# Patient Record
Sex: FEMALE | Race: WHITE | NOT HISPANIC OR LATINO | Employment: UNEMPLOYED | URBAN - METROPOLITAN AREA
[De-identification: names, ages, dates, MRNs, and addresses within clinical notes are randomized per-mention and may not be internally consistent; named-entity substitution may affect disease eponyms.]

---

## 2018-04-05 ENCOUNTER — HOSPITAL ENCOUNTER (EMERGENCY)
Facility: HOSPITAL | Age: 38
Discharge: PSYCHIATRIC OR SUBSTANCE USE FACILITY | End: 2018-04-05
Attending: EMERGENCY MEDICINE
Payer: MEDICAID

## 2018-04-05 VITALS
BODY MASS INDEX: 23.6 KG/M2 | DIASTOLIC BLOOD PRESSURE: 67 MMHG | HEART RATE: 99 BPM | OXYGEN SATURATION: 98 % | WEIGHT: 125 LBS | HEIGHT: 61 IN | SYSTOLIC BLOOD PRESSURE: 102 MMHG | TEMPERATURE: 99 F | RESPIRATION RATE: 16 BRPM

## 2018-04-05 DIAGNOSIS — R45.851 SUICIDAL IDEATION: ICD-10-CM

## 2018-04-05 DIAGNOSIS — F22 DELUSIONAL DISORDER: Primary | ICD-10-CM

## 2018-04-05 DIAGNOSIS — R41.82 ALTERED MENTAL STATUS: ICD-10-CM

## 2018-04-05 DIAGNOSIS — T50.902A DRUG OVERDOSE, INTENTIONAL SELF-HARM, INITIAL ENCOUNTER: ICD-10-CM

## 2018-04-05 LAB
ALBUMIN SERPL BCP-MCNC: 4.9 G/DL
ALP SERPL-CCNC: 69 U/L
ALT SERPL W/O P-5'-P-CCNC: 10 U/L
AMPHET+METHAMPHET UR QL: NEGATIVE
ANION GAP SERPL CALC-SCNC: 16 MMOL/L
APAP SERPL-MCNC: <3 UG/ML
APAP SERPL-MCNC: <3 UG/ML
AST SERPL-CCNC: 13 U/L
B-HCG UR QL: NEGATIVE
BACTERIA #/AREA URNS HPF: NORMAL /HPF
BARBITURATES UR QL SCN>200 NG/ML: NEGATIVE
BASOPHILS # BLD AUTO: 0.02 K/UL
BASOPHILS NFR BLD: 0.2 %
BENZODIAZ UR QL SCN>200 NG/ML: NEGATIVE
BILIRUB SERPL-MCNC: 0.9 MG/DL
BILIRUB UR QL STRIP: NEGATIVE
BUN SERPL-MCNC: 7 MG/DL
BZE UR QL SCN: NEGATIVE
CALCIUM SERPL-MCNC: 10.3 MG/DL
CANNABINOIDS UR QL SCN: NEGATIVE
CHLORIDE SERPL-SCNC: 104 MMOL/L
CLARITY UR: CLEAR
CO2 SERPL-SCNC: 23 MMOL/L
COLOR UR: YELLOW
CREAT SERPL-MCNC: 0.9 MG/DL
CREAT UR-MCNC: 170.3 MG/DL
CTP QC/QA: YES
DIFFERENTIAL METHOD: ABNORMAL
EOSINOPHIL # BLD AUTO: 0 K/UL
EOSINOPHIL NFR BLD: 0.1 %
ERYTHROCYTE [DISTWIDTH] IN BLOOD BY AUTOMATED COUNT: 12.4 %
EST. GFR  (AFRICAN AMERICAN): >60 ML/MIN/1.73 M^2
EST. GFR  (NON AFRICAN AMERICAN): >60 ML/MIN/1.73 M^2
ETHANOL SERPL-MCNC: <10 MG/DL
GLUCOSE SERPL-MCNC: 129 MG/DL
GLUCOSE UR QL STRIP: NEGATIVE
HCT VFR BLD AUTO: 44.8 %
HGB BLD-MCNC: 16.1 G/DL
HGB UR QL STRIP: NEGATIVE
HYALINE CASTS #/AREA URNS LPF: 0 /LPF
KETONES UR QL STRIP: ABNORMAL
LEUKOCYTE ESTERASE UR QL STRIP: NEGATIVE
LYMPHOCYTES # BLD AUTO: 1 K/UL
LYMPHOCYTES NFR BLD: 9.4 %
MCH RBC QN AUTO: 31.9 PG
MCHC RBC AUTO-ENTMCNC: 35.9 G/DL
MCV RBC AUTO: 89 FL
METHADONE UR QL SCN>300 NG/ML: NEGATIVE
MICROSCOPIC COMMENT: NORMAL
MONOCYTES # BLD AUTO: 1 K/UL
MONOCYTES NFR BLD: 9.1 %
NEUTROPHILS # BLD AUTO: 8.8 K/UL
NEUTROPHILS NFR BLD: 81.2 %
NITRITE UR QL STRIP: NEGATIVE
OPIATES UR QL SCN: NEGATIVE
PCP UR QL SCN>25 NG/ML: NEGATIVE
PH UR STRIP: 6 [PH] (ref 5–8)
PLATELET # BLD AUTO: 422 K/UL
PMV BLD AUTO: 10.8 FL
POTASSIUM SERPL-SCNC: 3.5 MMOL/L
PROT SERPL-MCNC: 8.4 G/DL
PROT UR QL STRIP: ABNORMAL
RBC # BLD AUTO: 5.04 M/UL
RBC #/AREA URNS HPF: 1 /HPF (ref 0–4)
SODIUM SERPL-SCNC: 143 MMOL/L
SP GR UR STRIP: 1.02 (ref 1–1.03)
SQUAMOUS #/AREA URNS HPF: 1 /HPF
TOXICOLOGY INFORMATION: NORMAL
TSH SERPL DL<=0.005 MIU/L-ACNC: 1.24 UIU/ML
URN SPEC COLLECT METH UR: ABNORMAL
UROBILINOGEN UR STRIP-ACNC: NEGATIVE EU/DL
WBC # BLD AUTO: 10.82 K/UL
WBC #/AREA URNS HPF: 0 /HPF (ref 0–5)

## 2018-04-05 PROCEDURE — 99285 EMERGENCY DEPT VISIT HI MDM: CPT | Mod: 25

## 2018-04-05 PROCEDURE — 96372 THER/PROPH/DIAG INJ SC/IM: CPT | Mod: 59

## 2018-04-05 PROCEDURE — 80320 DRUG SCREEN QUANTALCOHOLS: CPT

## 2018-04-05 PROCEDURE — 63600175 PHARM REV CODE 636 W HCPCS: Performed by: EMERGENCY MEDICINE

## 2018-04-05 PROCEDURE — 80307 DRUG TEST PRSMV CHEM ANLYZR: CPT

## 2018-04-05 PROCEDURE — 85025 COMPLETE CBC W/AUTO DIFF WBC: CPT

## 2018-04-05 PROCEDURE — 96375 TX/PRO/DX INJ NEW DRUG ADDON: CPT

## 2018-04-05 PROCEDURE — 84443 ASSAY THYROID STIM HORMONE: CPT

## 2018-04-05 PROCEDURE — 80329 ANALGESICS NON-OPIOID 1 OR 2: CPT | Mod: 59

## 2018-04-05 PROCEDURE — 96374 THER/PROPH/DIAG INJ IV PUSH: CPT

## 2018-04-05 PROCEDURE — 80053 COMPREHEN METABOLIC PANEL: CPT

## 2018-04-05 PROCEDURE — 81025 URINE PREGNANCY TEST: CPT | Performed by: EMERGENCY MEDICINE

## 2018-04-05 PROCEDURE — 81000 URINALYSIS NONAUTO W/SCOPE: CPT | Mod: 59

## 2018-04-05 RX ORDER — DIPHENHYDRAMINE HYDROCHLORIDE 50 MG/ML
25 INJECTION INTRAMUSCULAR; INTRAVENOUS
Status: COMPLETED | OUTPATIENT
Start: 2018-04-05 | End: 2018-04-05

## 2018-04-05 RX ORDER — DIPHENHYDRAMINE HYDROCHLORIDE 50 MG/ML
25 INJECTION INTRAMUSCULAR; INTRAVENOUS
Status: DISCONTINUED | OUTPATIENT
Start: 2018-04-05 | End: 2018-04-05

## 2018-04-05 RX ORDER — LORAZEPAM 2 MG/ML
3 INJECTION INTRAMUSCULAR
Status: COMPLETED | OUTPATIENT
Start: 2018-04-05 | End: 2018-04-05

## 2018-04-05 RX ORDER — LORAZEPAM 2 MG/ML
3 INJECTION INTRAMUSCULAR
Status: DISCONTINUED | OUTPATIENT
Start: 2018-04-05 | End: 2018-04-05

## 2018-04-05 RX ORDER — ZIPRASIDONE MESYLATE 20 MG/ML
20 INJECTION, POWDER, LYOPHILIZED, FOR SOLUTION INTRAMUSCULAR
Status: COMPLETED | OUTPATIENT
Start: 2018-04-05 | End: 2018-04-05

## 2018-04-05 RX ADMIN — ZIPRASIDONE MESYLATE 20 MG: 20 INJECTION, POWDER, LYOPHILIZED, FOR SOLUTION INTRAMUSCULAR at 01:04

## 2018-04-05 RX ADMIN — DIPHENHYDRAMINE HYDROCHLORIDE 25 MG: 50 INJECTION, SOLUTION INTRAMUSCULAR; INTRAVENOUS at 01:04

## 2018-04-05 RX ADMIN — LORAZEPAM 3 MG: 2 INJECTION INTRAMUSCULAR; INTRAVENOUS at 01:04

## 2018-04-05 NOTE — ED NOTES
Report received from BEAU Gray. Pt asleep in bed, placed on portable pulse ox to monitor pt after medication per MD verbal order. Pt in no acute distress. Sitter at bedside, will continue to monitor.

## 2018-04-05 NOTE — ED PROVIDER NOTES
"Encounter Date: 4/5/2018    SCRIBE #1 NOTE: I, Jessica Jake, am scribing for, and in the presence of,  Sarabjit Mabry MD. I have scribed the following portions of the note - Other sections scribed: HPI and ROS.       History     Chief Complaint   Patient presents with    Suicidal     Took 25-30 mg of lortabs at 10:00 am in attempt to harm herself.  Pt reports vomiting prior to EMS arrival, and stated "I think they had some of the medicine in my throw up."       CC: Suicidal    HPI: This 38 y.o. female with no medical history presents to the ED for evaluation of suicidal ideation. Pt reports taking 5-6x tablets (5 mg each) of Loratab this morning in attempts to harm herself. She states that she experienced 1x episode of emesis after ingestion of the medication and notes that she presently feels nauseated as well as fatigued. Pt reports that she has "just been sad lately", but denies experiencing any recent difficult life events. She states that she presently continues to feel sad and as though she may harm herself. Pt notes that she has attempted suicide in the past, but denies past psychiatric hospitalization. Pt also denies headache, ear pain, fever, chills, diaphoresis, chest pain, cough, abdominal pain, diarrhea, extremity issues and rash. No other associated symptoms.           The history is provided by the patient. No  was used.     Review of patient's allergies indicates:  Allergies not on file  Past Medical History:   Diagnosis Date    Anxiety     Depression      History reviewed. No pertinent surgical history.  History reviewed. No pertinent family history.  Social History   Substance Use Topics    Smoking status: Unknown If Ever Smoked    Smokeless tobacco: Not on file    Alcohol use No     Review of Systems   Constitutional: Positive for fatigue. Negative for chills, diaphoresis and fever.   HENT: Negative for congestion, ear pain, rhinorrhea and sore throat.    Eyes: Negative " for pain and visual disturbance.   Respiratory: Negative for cough and shortness of breath.    Cardiovascular: Negative for chest pain.   Gastrointestinal: Positive for nausea and vomiting (1x episode). Negative for abdominal pain and diarrhea.   Genitourinary: Negative for dysuria.   Musculoskeletal: Negative for back pain and neck pain.   Skin: Negative for rash.   Neurological: Negative for headaches.   Psychiatric/Behavioral: Positive for suicidal ideas.       Physical Exam     Initial Vitals [04/05/18 1134]   BP Pulse Resp Temp SpO2   127/73 106 16 98.6 °F (37 °C) 98 %      MAP       91         Physical Exam  The patient was examined specifically for the following:   General:No significant distress, Good color, Warm and dry. Head and neck:Scalp atraumatic, Neck supple. Neurological:Appropriate conversation, Gross motor deficits. Eyes:Conjugate gaze, Clear corneas. ENT: No epistaxis. Cardiac: Regular rate and rhythm, Grossly normal heart tones. Pulmonary: Wheezing, Rales. Gastrointestinal: Abdominal tenderness, Abdominal distention. Musculoskeletal: Extremity deformity, Apparent pain with range of motion of the joints. Skin: Rash.   The findings on examination were normal except for the following: The patient is depressed and suicidal.  The lungs are clear.  The heart tones are normal.  The abdomen is soft.  ED Course   Critical Care  Date/Time: 4/5/2018 4:14 PM  Performed by: NEHA THOMAS.  Authorized by: NEHA THOMAS   Direct patient critical care time: 22 minutes  Additional history critical care time: 11 minutes  Ordering / reviewing critical care time: 11 minutes  Documentation critical care time: 17 minutes  Consulting other physicians critical care time: 4 minutes  Total critical care time (exclusive of procedural time) : 65 minutes  Critical care time was exclusive of separately billable procedures and treating other patients and teaching time.  Critical care was necessary to treat or prevent  imminent or life-threatening deterioration of the following conditions: CNS failure or compromise.  Critical care was time spent personally by me on the following activities: discussions with consultants, evaluation of patient's response to treatment, examination of patient, ordering and performing treatments and interventions, obtaining history from patient or surrogate, ordering and review of laboratory studies, ordering and review of radiographic studies, pulse oximetry, re-evaluation of patient's condition and review of old charts.        Labs Reviewed   CBC W/ AUTO DIFFERENTIAL - Abnormal; Notable for the following:        Result Value    Hemoglobin 16.1 (*)     MCH 31.9 (*)     Platelets 422 (*)     Gran # (ANC) 8.8 (*)     Gran% 81.2 (*)     Lymph% 9.4 (*)     All other components within normal limits   COMPREHENSIVE METABOLIC PANEL - Abnormal; Notable for the following:     Glucose 129 (*)     All other components within normal limits   URINALYSIS - Abnormal; Notable for the following:     Protein, UA 1+ (*)     Ketones, UA 2+ (*)     All other components within normal limits   ACETAMINOPHEN LEVEL - Abnormal; Notable for the following:     Acetaminophen (Tylenol), Serum <3.0 (*)     All other components within normal limits   ACETAMINOPHEN LEVEL - Abnormal; Notable for the following:     Acetaminophen (Tylenol), Serum <3.0 (*)     All other components within normal limits   TSH   DRUG SCREEN PANEL, URINE EMERGENCY   ALCOHOL,MEDICAL (ETHANOL)   URINALYSIS MICROSCOPIC   POCT URINE PREGNANCY          X-Rays:   Independently Interpreted Readings:   Other Readings:  CT the head failed to reveal significant abnormalities.    Medical decision making: This patient is not known to us.  The patient presents with a history of taking an overdose of hydrocodone.  Acetaminophen level is low.  I doubt significant acetaminophen toxicity.  The patient vomited once.  After her arrival she began to exhibit delusional behavior.  My assumption is that she has history of psychiatric disease.  A CT of the head was unremarkable the patient is not febrile ill toxic.  There is no elevated white blood count.  The patient has no signs of hepatic or renal insufficiency.  She has a mild anemia and a mild thrombocytosis.  I will disposition her to psychiatry.  She was treated her in the emergency room with Geodon and Benadryl and Ativan.    This patient is medically clear for psychiatric admission.             Scribe Attestation:   Scribe #1: I performed the above scribed service and the documentation accurately describes the services I performed. I attest to the accuracy of the note.    Attending Attestation:           Physician Attestation for Scribe:  Physician Attestation Statement for Scribe #1: I, Sarabjit Mabry MD, reviewed documentation, as scribed by Jessica Joseph in my presence, and it is both accurate and complete.                    Clinical Impression:   The primary encounter diagnosis was Delusional disorder. Diagnoses of Altered mental status, Suicidal ideation, and Drug overdose, intentional self-harm, initial encounter were also pertinent to this visit.                           Sarabjit Mabry MD  04/05/18 1615       Sarabjit Mabry MD  04/05/18 1617       Sarabjit Mabry MD  04/05/18 2015

## 2018-04-05 NOTE — ED TRIAGE NOTES
Pt to the ED via EMS with c/o SI. Pt reports taking five to six 5 mg Lortabs and vomited after initial dose. Pt appears calm, however withdrawn and uncooperative. Pt refuses to sit in bed. Pt placed in paper scrubs and all clothes removed from room. Pt reports 3-4 past SI attempts with psychiatric care, however denies any medications prescribed. Pt seems unwilling to communicate with staff. No acute distress noted.

## 2018-04-05 NOTE — ED NOTES
Pt educated on need for blood specimens, pt refuses. Pt acknowledges the need after multiple attempts to re-educate.

## 2018-04-05 NOTE — ED NOTES
Pt continues to refuse to get into bed, pt starring at walk, mumbling words, nose pressed against wall. Bizarre behavior noted.